# Patient Record
Sex: MALE | Race: WHITE | Employment: FULL TIME | URBAN - METROPOLITAN AREA
[De-identification: names, ages, dates, MRNs, and addresses within clinical notes are randomized per-mention and may not be internally consistent; named-entity substitution may affect disease eponyms.]

---

## 2017-03-26 ENCOUNTER — HOSPITAL ENCOUNTER (EMERGENCY)
Facility: HOSPITAL | Age: 42
Discharge: HOME | End: 2017-03-26
Attending: EMERGENCY MEDICINE | Admitting: INTERNAL MEDICINE
Payer: COMMERCIAL

## 2017-03-26 ENCOUNTER — ANESTHESIA (OUTPATIENT)
Dept: ENDOSCOPY | Facility: HOSPITAL | Age: 42
End: 2017-03-26
Payer: COMMERCIAL

## 2017-03-26 ENCOUNTER — TELEPHONE (OUTPATIENT)
Dept: GASTROENTEROLOGY | Facility: CLINIC | Age: 42
End: 2017-03-26

## 2017-03-26 ENCOUNTER — ANESTHESIA EVENT (OUTPATIENT)
Dept: ENDOSCOPY | Facility: HOSPITAL | Age: 42
End: 2017-03-26
Payer: COMMERCIAL

## 2017-03-26 ENCOUNTER — SURGERY (OUTPATIENT)
Age: 42
End: 2017-03-26

## 2017-03-26 VITALS
HEART RATE: 75 BPM | HEIGHT: 74 IN | TEMPERATURE: 98 F | RESPIRATION RATE: 16 BRPM | DIASTOLIC BLOOD PRESSURE: 72 MMHG | OXYGEN SATURATION: 92 % | SYSTOLIC BLOOD PRESSURE: 123 MMHG | WEIGHT: 225 LBS | BODY MASS INDEX: 28.88 KG/M2

## 2017-03-26 DIAGNOSIS — T18.108A ESOPHAGEAL FOREIGN BODY, INITIAL ENCOUNTER: Primary | ICD-10-CM

## 2017-03-26 LAB
ANION GAP SERPL CALC-SCNC: 9 MMOL/L (ref 0–18)
BASOPHILS # BLD: 0 K/UL (ref 0–0.2)
BASOPHILS NFR BLD: 0 %
BUN SERPL-MCNC: 20 MG/DL (ref 8–20)
BUN/CREAT SERPL: 18 (ref 10–20)
CALCIUM SERPL-MCNC: 9.1 MG/DL (ref 8.5–10.5)
CHLORIDE SERPL-SCNC: 106 MMOL/L (ref 95–110)
CO2 SERPL-SCNC: 25 MMOL/L (ref 22–32)
CREAT SERPL-MCNC: 1.11 MG/DL (ref 0.5–1.5)
EOSINOPHIL # BLD: 0.1 K/UL (ref 0–0.7)
EOSINOPHIL NFR BLD: 1 %
ERYTHROCYTE [DISTWIDTH] IN BLOOD BY AUTOMATED COUNT: 13.3 % (ref 11–15)
GLUCOSE SERPL-MCNC: 110 MG/DL (ref 70–99)
HCT VFR BLD AUTO: 44.2 % (ref 41–52)
HGB BLD-MCNC: 14.9 G/DL (ref 13.5–17.5)
LYMPHOCYTES # BLD: 1.3 K/UL (ref 1–4)
LYMPHOCYTES NFR BLD: 16 %
MCH RBC QN AUTO: 29.9 PG (ref 27–32)
MCHC RBC AUTO-ENTMCNC: 33.8 G/DL (ref 32–37)
MCV RBC AUTO: 88.5 FL (ref 80–100)
MONOCYTES # BLD: 0.5 K/UL (ref 0–1)
MONOCYTES NFR BLD: 6 %
NEUTROPHILS # BLD AUTO: 6.2 K/UL (ref 1.8–7.7)
NEUTROPHILS NFR BLD: 77 %
OSMOLALITY UR CALC.SUM OF ELEC: 293 MOSM/KG (ref 275–295)
PLATELET # BLD AUTO: 209 K/UL (ref 140–400)
PMV BLD AUTO: 8.4 FL (ref 7.4–10.3)
POTASSIUM SERPL-SCNC: 4.1 MMOL/L (ref 3.3–5.1)
RBC # BLD AUTO: 4.99 M/UL (ref 4.5–5.9)
SODIUM SERPL-SCNC: 140 MMOL/L (ref 136–144)
WBC # BLD AUTO: 8.1 K/UL (ref 4–11)

## 2017-03-26 PROCEDURE — 0DB68ZX EXCISION OF STOMACH, VIA NATURAL OR ARTIFICIAL OPENING ENDOSCOPIC, DIAGNOSTIC: ICD-10-PCS | Performed by: INTERNAL MEDICINE

## 2017-03-26 PROCEDURE — 43247 EGD REMOVE FOREIGN BODY: CPT | Performed by: INTERNAL MEDICINE

## 2017-03-26 PROCEDURE — 43239 EGD BIOPSY SINGLE/MULTIPLE: CPT | Performed by: INTERNAL MEDICINE

## 2017-03-26 PROCEDURE — 0DB38ZX EXCISION OF LOWER ESOPHAGUS, VIA NATURAL OR ARTIFICIAL OPENING ENDOSCOPIC, DIAGNOSTIC: ICD-10-PCS | Performed by: INTERNAL MEDICINE

## 2017-03-26 PROCEDURE — 0DB18ZX EXCISION OF UPPER ESOPHAGUS, VIA NATURAL OR ARTIFICIAL OPENING ENDOSCOPIC, DIAGNOSTIC: ICD-10-PCS | Performed by: INTERNAL MEDICINE

## 2017-03-26 PROCEDURE — 0DC58ZZ EXTIRPATION OF MATTER FROM ESOPHAGUS, VIA NATURAL OR ARTIFICIAL OPENING ENDOSCOPIC: ICD-10-PCS | Performed by: INTERNAL MEDICINE

## 2017-03-26 RX ORDER — LIDOCAINE HYDROCHLORIDE 10 MG/ML
INJECTION, SOLUTION EPIDURAL; INFILTRATION; INTRACAUDAL; PERINEURAL AS NEEDED
Status: DISCONTINUED | OUTPATIENT
Start: 2017-03-26 | End: 2017-03-26 | Stop reason: SURG

## 2017-03-26 RX ORDER — ONDANSETRON 2 MG/ML
INJECTION INTRAMUSCULAR; INTRAVENOUS AS NEEDED
Status: DISCONTINUED | OUTPATIENT
Start: 2017-03-26 | End: 2017-03-26 | Stop reason: SURG

## 2017-03-26 RX ORDER — MIDAZOLAM HYDROCHLORIDE 1 MG/ML
INJECTION INTRAMUSCULAR; INTRAVENOUS AS NEEDED
Status: DISCONTINUED | OUTPATIENT
Start: 2017-03-26 | End: 2017-03-26 | Stop reason: SURG

## 2017-03-26 RX ORDER — DEXAMETHASONE SODIUM PHOSPHATE 4 MG/ML
VIAL (ML) INJECTION AS NEEDED
Status: DISCONTINUED | OUTPATIENT
Start: 2017-03-26 | End: 2017-03-26 | Stop reason: SURG

## 2017-03-26 RX ORDER — SUCCINYLCHOLINE CHLORIDE 20 MG/ML
INJECTION INTRAMUSCULAR; INTRAVENOUS AS NEEDED
Status: DISCONTINUED | OUTPATIENT
Start: 2017-03-26 | End: 2017-03-26 | Stop reason: SURG

## 2017-03-26 RX ORDER — OMEPRAZOLE 40 MG/1
40 CAPSULE, DELAYED RELEASE ORAL DAILY
Qty: 30 CAPSULE | Refills: 3 | Status: SHIPPED | OUTPATIENT
Start: 2017-03-26 | End: 2017-04-25

## 2017-03-26 RX ADMIN — LIDOCAINE HYDROCHLORIDE 50 MG: 10 INJECTION, SOLUTION EPIDURAL; INFILTRATION; INTRACAUDAL; PERINEURAL at 14:24:00

## 2017-03-26 RX ADMIN — MIDAZOLAM HYDROCHLORIDE 2 MG: 1 INJECTION INTRAMUSCULAR; INTRAVENOUS at 14:24:00

## 2017-03-26 RX ADMIN — ONDANSETRON 4 MG: 2 INJECTION INTRAMUSCULAR; INTRAVENOUS at 14:33:00

## 2017-03-26 RX ADMIN — DEXAMETHASONE SODIUM PHOSPHATE 4 MG: 4 MG/ML VIAL (ML) INJECTION at 14:33:00

## 2017-03-26 RX ADMIN — SUCCINYLCHOLINE CHLORIDE 140 MG: 20 INJECTION INTRAMUSCULAR; INTRAVENOUS at 14:25:00

## 2017-03-26 NOTE — ANESTHESIA PREPROCEDURE EVALUATION
Anesthesia PreOp Note    HPI:     Daniella Moya is a 39year old male who presents for preoperative consultation requested by: Oleg Womack MD    Date of Surgery: 3/26/2017    Procedure(s):  ESOPHAGOGASTRODUODENOSCOPY (EGD)  Indication: food impaction Narrative       Available pre-op labs reviewed.     Lab Results  Component Value Date   WBC 8.1 03/26/2017   RBC 4.99 03/26/2017   HGB 14.9 03/26/2017   HCT 44.2 03/26/2017   MCV 88.5 03/26/2017   MCH 29.9 03/26/2017   MCHC 33.8 03/26/2017   RDW 13.3 03/26/

## 2017-03-26 NOTE — ED PROVIDER NOTES
Patient Seen in: [facility] AND [facility] Emergency Department    History   Patient presents with:  FB in Throat (GI, respiratory)    Stated Complaint: Hard to swallow    HPI    77-year-old male with history of GERD presents with complaints of difficulty swallo (36.7 °C)   Temp src 03/26/17 1236 Temporal   SpO2 03/26/17 1236 97 %   O2 Device 03/26/17 1236 None (Room air)       Current:/75 mmHg  Pulse 68  Temp(Src) 98.1 °F (36.7 °C) (Temporal)  Resp 15  Ht 188 cm (6' 2\")  Wt 102.059 kg  BMI 28.88 kg/m2  SpO team for EGD. Disposition and Plan     Clinical Impression:  Esophageal foreign body, initial encounter  (primary encounter diagnosis)    Disposition:  Send to or/cath/gi    Follow-up:  No follow-up provider specified.     Medications Prescribed:  Curr

## 2017-03-26 NOTE — TELEPHONE ENCOUNTER
GI staff: please call patient to schedule EGD with MAC for dilatation of esophageal stricture, in 4 weeks.

## 2017-03-26 NOTE — ED NOTES
Pt stable, comfortable; aware of plan and has spoken to family. Dr Rashad Dang confirmed pt is ready for the GI lab. Pt has had nothing p.o. And is still spitting secretions in cup.  Pt to be discharged from endo after procedure is complete

## 2017-03-26 NOTE — OPERATIVE REPORT
ESOPHAGOGASTRODUODENOSCOPY (EGD) REPORT    Vinod De Leon DOB 10/16/1975 Age 39year old   PCP Pieter Hung MD Endoscopist Chandan Savage MD     Date of procedure: 2017    Procedure: EGD w/food impaction removal and biopsy.     Pre-opera esophagitis and pressure erosions and ulcerations. There was moderate esophagitis with friability. There was a smooth narrowing of the distal esophagus due to inflammation, no discrete stricture was present.  Biopsies taken of the distal and proximal esopha

## 2017-03-26 NOTE — CONSULTS
[address]   Gastroenterology Consultation Note    Brunilda Burk  Patient Status:    Emergency  Date of Admission:         3/26/2017, Hospital day #0  Attending:   Hansel Herzog MD  PCP:     Claude Macias MD    Reason for [facility] HPI  GENITOURINARY:  negative for dysuria or gross hematuria  INTEGUMENT/BREAST:  SKIN:  negative for jaundice   ALLERGIC/IMMUNOLOGIC:  negative for hay fever  ENDOCRINE:  negative for cold intolerance and heat intolerance  MUSCULOSKELETAL:  negative for j perforation all leading to prolonged hospitalization, surgical intervention, or even death. I also specifically mentioned the miss rate of upper endoscopy of 5-10% in the best of all circumstances. All questions were answered to the patient’s satisfaction.

## 2017-03-26 NOTE — ED INITIAL ASSESSMENT (HPI)
Pt reports after eating dinner reports difficulty swallowing. Pt reports eating ribs, and cauliflower. Pt reports hx of GERD. Randall Angles No drooling noted. No resp distress noted. Pt reports unable to eat/drink b/c \"There is something stuck in my throat.  I have be

## 2017-03-27 ENCOUNTER — TELEPHONE (OUTPATIENT)
Dept: GASTROENTEROLOGY | Facility: CLINIC | Age: 42
End: 2017-03-27

## 2017-03-27 NOTE — TELEPHONE ENCOUNTER
Path shows eosinophilic esophagitis, along with possibly GERD. Discussed pathophysiology, disease course of EoE. He needs to be on BID steroid swallowed treatment for 6-8 weeks, sent to pharmacy.     GI staff: can you please schedule EGD with MAC with dil f

## 2017-03-27 NOTE — TELEPHONE ENCOUNTER
Dr. Jevon Saleem I received another TE previously on 3/26/17 from you stating to schedule EGD for esophageal stricture do you want me to add an additional dx of EoE or remove esophageal stricture? Please advise. Thank you    MICHAEL. . I did call the patient and left

## 2017-03-28 NOTE — TELEPHONE ENCOUNTER
Scheduled for:  EGD w/Dil [address]  Provider Name:  Dr. Ragini Martin  Date:  5/17/17  Location:  [facility]  Sedation:  MAC  Time:  1000 (pt is aware to arrive at 0900)  Prep:  NPO after midnight, mailed 3/29/17 with codes  Meds/Allergies Reconciled?:  Yes  Diagnosis with

## 2017-03-28 NOTE — TELEPHONE ENCOUNTER
CBLM to schedule procedure.  Please transfer to [facility] beds at the [facility] in GI

## 2017-05-17 ENCOUNTER — ANESTHESIA EVENT (OUTPATIENT)
Dept: ENDOSCOPY | Facility: HOSPITAL | Age: 42
End: 2017-05-17
Payer: COMMERCIAL

## 2017-05-17 ENCOUNTER — HOSPITAL ENCOUNTER (OUTPATIENT)
Facility: HOSPITAL | Age: 42
Setting detail: HOSPITAL OUTPATIENT SURGERY
Discharge: HOME | End: 2017-05-17
Attending: INTERNAL MEDICINE | Admitting: INTERNAL MEDICINE
Payer: COMMERCIAL

## 2017-05-17 ENCOUNTER — ANESTHESIA (OUTPATIENT)
Dept: ENDOSCOPY | Facility: HOSPITAL | Age: 42
End: 2017-05-17
Payer: COMMERCIAL

## 2017-05-17 ENCOUNTER — SURGERY (OUTPATIENT)
Age: 42
End: 2017-05-17

## 2017-05-17 DIAGNOSIS — K20.90 ESOPHAGITIS: Primary | ICD-10-CM

## 2017-05-17 DIAGNOSIS — K22.2 SCHATZKI'S RING: ICD-10-CM

## 2017-05-17 PROCEDURE — 43239 EGD BIOPSY SINGLE/MULTIPLE: CPT | Performed by: INTERNAL MEDICINE

## 2017-05-17 PROCEDURE — 0D748ZZ DILATION OF ESOPHAGOGASTRIC JUNCTION, VIA NATURAL OR ARTIFICIAL OPENING ENDOSCOPIC: ICD-10-PCS | Performed by: INTERNAL MEDICINE

## 2017-05-17 PROCEDURE — 0DB38ZX EXCISION OF LOWER ESOPHAGUS, VIA NATURAL OR ARTIFICIAL OPENING ENDOSCOPIC, DIAGNOSTIC: ICD-10-PCS | Performed by: INTERNAL MEDICINE

## 2017-05-17 PROCEDURE — 43248 EGD GUIDE WIRE INSERTION: CPT | Performed by: INTERNAL MEDICINE

## 2017-05-17 RX ORDER — SODIUM CHLORIDE, SODIUM LACTATE, POTASSIUM CHLORIDE, CALCIUM CHLORIDE 600; 310; 30; 20 MG/100ML; MG/100ML; MG/100ML; MG/100ML
INJECTION, SOLUTION INTRAVENOUS CONTINUOUS
Status: DISCONTINUED | OUTPATIENT
Start: 2017-05-17 | End: 2017-05-17

## 2017-05-17 RX ORDER — SODIUM CHLORIDE, SODIUM LACTATE, POTASSIUM CHLORIDE, CALCIUM CHLORIDE 600; 310; 30; 20 MG/100ML; MG/100ML; MG/100ML; MG/100ML
INJECTION, SOLUTION INTRAVENOUS CONTINUOUS PRN
Status: DISCONTINUED | OUTPATIENT
Start: 2017-05-17 | End: 2017-05-17 | Stop reason: SURG

## 2017-05-17 RX ORDER — GLYCOPYRROLATE 0.2 MG/ML
INJECTION INTRAMUSCULAR; INTRAVENOUS AS NEEDED
Status: DISCONTINUED | OUTPATIENT
Start: 2017-05-17 | End: 2017-05-17 | Stop reason: SURG

## 2017-05-17 RX ORDER — LIDOCAINE HYDROCHLORIDE 10 MG/ML
INJECTION, SOLUTION EPIDURAL; INFILTRATION; INTRACAUDAL; PERINEURAL AS NEEDED
Status: DISCONTINUED | OUTPATIENT
Start: 2017-05-17 | End: 2017-05-17 | Stop reason: SURG

## 2017-05-17 RX ADMIN — GLYCOPYRROLATE 0.2 MG: 0.2 INJECTION INTRAMUSCULAR; INTRAVENOUS at 09:57:00

## 2017-05-17 RX ADMIN — SODIUM CHLORIDE, SODIUM LACTATE, POTASSIUM CHLORIDE, CALCIUM CHLORIDE: 600; 310; 30; 20 INJECTION, SOLUTION INTRAVENOUS at 09:55:00

## 2017-05-17 RX ADMIN — LIDOCAINE HYDROCHLORIDE 50 MG: 10 INJECTION, SOLUTION EPIDURAL; INFILTRATION; INTRACAUDAL; PERINEURAL at 09:58:00

## 2017-05-17 NOTE — H&P
History & Physical Examination    Patient Name: Na Collins  MRN: [id number]  [facility]: [id number]  YOB: 1975    Diagnosis: dysphagia, eosinophilic esophagitis, esophageal stricture      Prescriptions prior to admission:  Fluticasone Propionate Gastroenterology  5/17/2017  9:31 AM

## 2017-05-17 NOTE — ANESTHESIA PREPROCEDURE EVALUATION
Anesthesia PreOp Note    HPI:     Cordell Boyd is a 39year old male who presents for preoperative consultation requested by: Charity Ruvalcaba MD    Date of Surgery: 5/17/2017    Procedure(s):  ESOPHAGOGASTRODUODENOSCOPY (EGD)  Indication: Esophagitis, eo Packs/Day     Types: Cigarettes    Quit date: 01/01/2003    Smokeless tobacco: Not on file    Alcohol Use: Yes    Comment: beer and wine - 1 drink, daily    Drug Use: No    Sexual Activity: Not on file   Not on file  Other Topics Concern    Caffeine Concer

## 2017-05-17 NOTE — ANESTHESIA POSTPROCEDURE EVALUATION
Patient: Prasad Fernández    Procedure Summary     Date Anesthesia Start Anesthesia Stop Room / Location    05/17/17 0956  [facility] ENDOSCOPY 01 / [facility] ENDOSCOPY       Procedure Diagnosis Surgeon Responsible Provider    ESOPHAGOGASTRODUODENOSCOPY (EGD) (N/A ) Jenny

## 2017-05-18 VITALS
RESPIRATION RATE: 14 BRPM | HEIGHT: 74 IN | OXYGEN SATURATION: 97 % | HEART RATE: 60 BPM | DIASTOLIC BLOOD PRESSURE: 76 MMHG | WEIGHT: 225 LBS | SYSTOLIC BLOOD PRESSURE: 128 MMHG | BODY MASS INDEX: 28.88 KG/M2

## 2017-05-23 ENCOUNTER — TELEPHONE (OUTPATIENT)
Dept: GASTROENTEROLOGY | Facility: CLINIC | Age: 42
End: 2017-05-23

## 2020-12-11 ENCOUNTER — HOSPITAL ENCOUNTER (OUTPATIENT)
Age: 45
Discharge: HOME | End: 2020-12-11
Payer: COMMERCIAL

## 2020-12-11 VITALS
BODY MASS INDEX: 29 KG/M2 | WEIGHT: 225 LBS | SYSTOLIC BLOOD PRESSURE: 136 MMHG | TEMPERATURE: 98 F | HEART RATE: 74 BPM | DIASTOLIC BLOOD PRESSURE: 90 MMHG | OXYGEN SATURATION: 100 % | RESPIRATION RATE: 17 BRPM

## 2020-12-11 DIAGNOSIS — M54.12 CERVICAL RADICULOPATHY: Primary | ICD-10-CM

## 2020-12-11 DIAGNOSIS — R29.898 ARM WEAKNESS: ICD-10-CM

## 2020-12-11 PROCEDURE — 99203 OFFICE O/P NEW LOW 30 MIN: CPT | Performed by: EMERGENCY MEDICINE

## 2020-12-11 RX ORDER — OMEPRAZOLE 10 MG/1
10 CAPSULE, DELAYED RELEASE ORAL DAILY
COMMUNITY

## 2020-12-11 RX ORDER — PREDNISONE 20 MG/1
TABLET ORAL
Qty: 8 TABLET | Refills: 0 | Status: SHIPPED | OUTPATIENT
Start: 2020-12-11 | End: 2021-02-01 | Stop reason: ALTCHOICE

## 2020-12-11 RX ORDER — METHOCARBAMOL 500 MG/1
TABLET, FILM COATED ORAL
Qty: 14 TABLET | Refills: 0 | Status: SHIPPED | OUTPATIENT
Start: 2020-12-11 | End: 2021-10-18

## 2020-12-11 NOTE — ED PROVIDER NOTES
Patient Seen in: [facility]      History   Patient presents with:  Neck Pain: I think I have a pinched nerve. Pain in shoulder and neck.  Weakness in shoulder started this morning. - Entered by patient  Shoulder Pain    Stated Complaint: Arm patient/caregiver and is not pertinent to presenting problem. No pertinent social history. Review of Systems    Positive for stated complaint: Arm or Hand Injury - I think I have a pinched nerve. Pain in shoulder and neck. *  Other systems are compromise at this time. Will start patient on prednisone 5 days, and Robaxin muscle relaxer. Rechecked patient. Updated patient  on all findings and plan, who verbalized understanding and agreement with the plan. All questions answered.      I have gi

## 2020-12-11 NOTE — ED INITIAL ASSESSMENT (HPI)
Pt states that he started with neck pain a week ago. He developed pain in his left shoulder 2-3 days ago with weakness in left arm, has full range of motion. Pt unsure if numbness or tingling. Denies injury.

## 2020-12-15 ENCOUNTER — HOSPITAL ENCOUNTER (OUTPATIENT)
Dept: GENERAL RADIOLOGY | Facility: HOSPITAL | Age: 45
Discharge: HOME | End: 2020-12-15
Attending: ORTHOPAEDIC SURGERY
Payer: COMMERCIAL

## 2020-12-15 ENCOUNTER — OFFICE VISIT (OUTPATIENT)
Dept: ORTHOPEDICS CLINIC | Facility: CLINIC | Age: 45
End: 2020-12-15
Payer: COMMERCIAL

## 2020-12-15 DIAGNOSIS — M25.512 LEFT SHOULDER PAIN, UNSPECIFIED CHRONICITY: Primary | ICD-10-CM

## 2020-12-15 DIAGNOSIS — M25.512 LEFT SHOULDER PAIN, UNSPECIFIED CHRONICITY: ICD-10-CM

## 2020-12-15 DIAGNOSIS — M54.12 CERVICAL RADICULOPATHY: ICD-10-CM

## 2020-12-15 PROCEDURE — 99204 OFFICE O/P NEW MOD 45 MIN: CPT | Performed by: ORTHOPAEDIC SURGERY

## 2020-12-15 PROCEDURE — 73030 X-RAY EXAM OF SHOULDER: CPT | Performed by: ORTHOPAEDIC SURGERY

## 2020-12-15 RX ORDER — METHYLPREDNISOLONE 4 MG/1
TABLET ORAL
Qty: 1 PACKAGE | Refills: 0 | Status: SHIPPED | OUTPATIENT
Start: 2020-12-15 | End: 2021-02-01 | Stop reason: ALTCHOICE

## 2020-12-16 NOTE — H&P
NURSING INTAKE COMMENTS: Patient presents with:  Shoulder Pain: Left - onset few weeks ago - no injury - stated with neck pain and went down into the shoulder and he has reduced strength and ROM - occasinal numbness and tingling in the hand and fingers - p pill for 2 days. 8 tablet 0   • methocarbamol 500 MG Oral Tab 1-2 tabs every 6 hours as needed. 14 tablet 0   • LORazepam (ATIVAN) 0.5 MG Oral Tab Take  by mouth. • Vitamin D3 (VITAMIN D3) 2000 UNITS Oral Cap Take  by mouth.        No Known Allergies  F the shoulder without shoulder discomfort. He had negative crossover no pain to the [facility] joint. He had 5/5 strength with bicep strength no tenderness on the biceps tendon along the bicipital groove.  He has 5/5 strength with rotator cuff strength testing no te Future  -     Cancel: XR CERVICAL SPINE (2 VIEWS) (CPT=72040); Future  -     MRI SPINE CERVICAL (CPT=72141);  Future    Other orders  -     methylPREDNISolone 4 MG Oral Tablet Therapy Pack; TAKE AS PER INSTRUCTION SHEET        Assessment: Left cervical radi

## 2020-12-22 ENCOUNTER — HOSPITAL ENCOUNTER (OUTPATIENT)
Dept: MRI IMAGING | Facility: HOSPITAL | Age: 45
Discharge: HOME | End: 2020-12-22
Attending: PHYSICIAN ASSISTANT
Payer: COMMERCIAL

## 2020-12-22 DIAGNOSIS — M54.12 CERVICAL RADICULOPATHY: ICD-10-CM

## 2020-12-22 PROCEDURE — 72141 MRI NECK SPINE W/O DYE: CPT | Performed by: PHYSICIAN ASSISTANT

## 2021-01-04 ENCOUNTER — OFFICE VISIT (OUTPATIENT)
Dept: ORTHOPEDICS CLINIC | Facility: CLINIC | Age: 46
End: 2021-01-04
Payer: COMMERCIAL

## 2021-01-04 VITALS — WEIGHT: 230 LBS | BODY MASS INDEX: 29.52 KG/M2 | HEIGHT: 74 IN

## 2021-01-04 DIAGNOSIS — M54.12 CERVICAL RADICULOPATHY: Primary | ICD-10-CM

## 2021-01-04 DIAGNOSIS — M48.02 FORAMINAL STENOSIS OF CERVICAL REGION: ICD-10-CM

## 2021-01-04 PROCEDURE — 99213 OFFICE O/P EST LOW 20 MIN: CPT | Performed by: ORTHOPAEDIC SURGERY

## 2021-01-04 PROCEDURE — 3008F BODY MASS INDEX DOCD: CPT | Performed by: ORTHOPAEDIC SURGERY

## 2021-01-04 NOTE — PROGRESS NOTES
NURSING INTAKE COMMENTS: Patient presents with:  Shoulder Pain: left shoulder, MRI results      HPI: This 39year old male presents today to review his left cervical radiculopathy as well as the recent cervical MRI.   Since her last visit, his symptoms have file    Tobacco Use      Smoking status: Former Smoker        Packs/day: 0.25        Types: Cigarettes        Quit date: 2003        Years since quittin.0      Smokeless tobacco: Never Used    Substance and Sexual Activity      Alcohol use:  Yes was a negative Anne-Marie test and symmetrically diminished reflexes. Balance and gait were normal.    Imaging: MRI cervical spine was reviewed with him both film and report. He has severe stenosis at the left C4-5 and left C5-6.   I think the C4-5 level is swelling of the prevertebral soft tissues. Small dependent left maxillary sinus retention cyst.  CERVICAL DISC LEVELS: C2-C3: No significant disc/facet abnormality, spinal stenosis, or foraminal stenosis.   C3-C4: Posterior disc-osteophyte complex with rig 03/26/2017     03/26/2017      Lab Results   Component Value Date     (H) 03/26/2017    BUN 20 03/26/2017    CREATSERUM 1.11 03/26/2017    GFRNAA >60 03/26/2017    GFRAA >60 03/26/2017        Assessment and Plan:  Diagnoses and all orders for

## 2021-02-01 ENCOUNTER — OFFICE VISIT (OUTPATIENT)
Dept: ORTHOPEDICS CLINIC | Facility: CLINIC | Age: 46
End: 2021-02-01
Payer: COMMERCIAL

## 2021-02-01 VITALS — BODY MASS INDEX: 29.52 KG/M2 | HEIGHT: 74 IN | WEIGHT: 230 LBS

## 2021-02-01 DIAGNOSIS — M48.02 FORAMINAL STENOSIS OF CERVICAL REGION: ICD-10-CM

## 2021-02-01 DIAGNOSIS — M54.12 CERVICAL RADICULOPATHY: Primary | ICD-10-CM

## 2021-02-01 PROCEDURE — 99213 OFFICE O/P EST LOW 20 MIN: CPT | Performed by: ORTHOPAEDIC SURGERY

## 2021-02-01 PROCEDURE — 3008F BODY MASS INDEX DOCD: CPT | Performed by: ORTHOPAEDIC SURGERY

## 2021-02-01 NOTE — PROGRESS NOTES
NURSING INTAKE COMMENTS: Patient presents with:  Shoulder Pain: f/u regarding left shoulder. Denies any pain at this time. per patient doing better.        HPI: This 39year old male presents today for follow-up with left cervical radiculopathy from left C4 and Sexual Activity      Alcohol use: Yes        Comment: beer and wine - 1 drink, daily      Drug use: No      Sexual activity: Not on file       Review of Systems:  GENERAL: feels generally well, no significant weight loss or weight gain  SKIN: no ulcera >60 03/26/2017        Assessment and Plan:  Diagnoses and all orders for this visit:    Cervical radiculopathy    Foraminal stenosis of cervical region        Assessment: Left C4-C5 and left C5-C6 stenosis from disc osteophyte complex with symptoms improvi

## 2021-10-18 ENCOUNTER — OFFICE VISIT (OUTPATIENT)
Dept: INTEGRATIVE MEDICINE | Facility: CLINIC | Age: 46
End: 2021-10-18
Payer: COMMERCIAL

## 2021-10-18 VITALS
HEART RATE: 58 BPM | BODY MASS INDEX: 29.95 KG/M2 | WEIGHT: 233.38 LBS | OXYGEN SATURATION: 97 % | SYSTOLIC BLOOD PRESSURE: 128 MMHG | DIASTOLIC BLOOD PRESSURE: 74 MMHG | HEIGHT: 74 IN

## 2021-10-18 DIAGNOSIS — E66.3 OVERWEIGHT (BMI 25.0-29.9): ICD-10-CM

## 2021-10-18 DIAGNOSIS — K21.9 GASTROESOPHAGEAL REFLUX DISEASE, UNSPECIFIED WHETHER ESOPHAGITIS PRESENT: ICD-10-CM

## 2021-10-18 DIAGNOSIS — Z12.11 ENCOUNTER FOR SCREENING COLONOSCOPY: ICD-10-CM

## 2021-10-18 DIAGNOSIS — Z00.00 WELL ADULT EXAM: Primary | ICD-10-CM

## 2021-10-18 PROCEDURE — 3074F SYST BP LT 130 MM HG: CPT | Performed by: FAMILY MEDICINE

## 2021-10-18 PROCEDURE — 99386 PREV VISIT NEW AGE 40-64: CPT | Performed by: FAMILY MEDICINE

## 2021-10-18 PROCEDURE — 3078F DIAST BP <80 MM HG: CPT | Performed by: FAMILY MEDICINE

## 2021-10-18 PROCEDURE — 3008F BODY MASS INDEX DOCD: CPT | Performed by: FAMILY MEDICINE

## 2021-10-18 NOTE — PATIENT INSTRUCTIONS
Optional Testing:     [facility] FIT 22 - Food Sensitivity Testing  This test evaluates 22 of the most common food sensitivities tested on the  test. The test measures IgG and Immune Complexes the same way the  test does. The test is run using a blo you need direct help. The products and items listed below (the “Products”)  and their claims have not been evaluated by the Food and Drug Administration. Dietary products are not intended to treat, prevent, mitigate or cure disease.  Ultimately, you mus essential, too. Below are guidelines for these, for men ages 36 to 52. Talk with your healthcare provider to make sure you’re up to date on what you need.   Screening Who needs it How often   Alcohol misuse All men in this age group At routine exams   Blood your healthcare provider 2 doses given at least 6 months apart   Hepatitis B Men at increased risk for infection – talk with your healthcare provider 3 doses over 6 months; second dose should be given 1 month after the first dose; the third dose should be reserved. This information is not intended as a substitute for professional medical care. Always follow your healthcare professional's instructions.

## 2021-10-18 NOTE — PROGRESS NOTES
Enrrique Cooper is a 55year old male.   Patient presents with:  Physical: NP physical       HPI:   Here for physical and wellness visit     Exercise: Walking dog, Hockey  Diet:Mostly cooks, eats 1-2 meals out per week  Stress:Mild to moderate   Occupation: I Oral Cap Take  by mouth.          SOCIAL HISTORY:   Social History    Socioeconomic History      Marital status:       Spouse name: Not on file      Number of children: Not on file      Years of education: Not on file      Highest education level: No Attends [employer]: Not on file      Active Member of Clubs or Organizations: Not on file      Attends Club or Organization Meetings: Not on file      Marital Status: Not on file  Intimate Partner Violence:       Fear of Current or Ex-Partner: Not Cervical back: Normal range of motion and neck supple. Skin:     General: Skin is warm. Findings: No rash. Neurological:      General: No focal deficit present. Mental Status: He is alert and oriented to person, place, and time.    Psychi PANEL  - HEMOGLOBIN A1C    2.  Gastroesophageal reflux disease, unspecified whether esophagitis present  - GASTRO - INTERNAL  - CBC WITH DIFFERENTIAL WITH PLATELET  - COMP METABOLIC PANEL (14)  - VITAMIN D, 25-HYDROXY  - FREE T4 (FREE THYROXINE)  - ASSAY, T month to get back. See https://[employer]. [employer]/ for more information. PLEASE NOTE: As this is a lab test done by an outside company, these results do not pull into your [employer] lab results online.  The results will be given to you online and a  its affiliates and its [facility] are not liable for the patients use of the Products.  [address] [place] [address] makes no representations or warranties of any kind, expressed or implied, as to the Products, including, but not limited to its efficacy, be and have 1 or more other risk factors for diabetes At least every 3 years (yearly if blood sugar has begun to rise)   Type 2 diabetes All men with prediabetes Every year   Hepatitis C Men at increased risk for infection – talk with your healthcare provider increased risk for infection – talk with your healthcare provider 1 or more doses   Pneumococcal conjugate vaccine (PCV13) and pneumococcal polysaccharide vaccine (PPSV23) Men at increased risk for infection – talk with your healthcare provider PCV13: 1 do personalized nutritional guide, and improving a patient’s state of health and energy levels. This test is not covered by insurance and costs $100. Test results take about month to get back. See https://Meetapp. [employer]/ for more information. dietary, or exercise program, especially if you are pregnant or have any pre-existing injuries or medical conditions.  The patient agrees that the [facility] and its affiliates and its [facility] are not liable for t Depression All men in this age group At routine exams   Type 2 diabetes or prediabetes All men beginning at age 39 and men  without symptoms at any age who are overweight or obese and have 1 or more other risk factors for diabetes At least every 3 years in this age group Once a year   Measles, mumps, rubella (MMR) All men in this age group who have no record of these infections or vaccines 1 or 2 doses   Meningococcal Men at increased risk for infection – talk with your healthcare provider 1 or more doses

## 2021-10-18 NOTE — PATIENT INSTRUCTIONS
Optional Testing:     [facility] FIT 22 - Food Sensitivity Testing  This test evaluates 22 of the most common food sensitivities tested on the  test. The test measures IgG and Immune Complexes the same way the  test does. The test is run using a blo you need direct help. The products and items listed below (the “Products”)  and their claims have not been evaluated by the Food and Drug Administration. Dietary products are not intended to treat, prevent, mitigate or cure disease.  Ultimately, you mus essential, too. Below are guidelines for these, for men ages 36 to 52. Talk with your healthcare provider to make sure you’re up to date on what you need.   Screening Who needs it How often   Alcohol misuse All men in this age group At routine exams   Blood your healthcare provider 2 doses given at least 6 months apart   Hepatitis B Men at increased risk for infection – talk with your healthcare provider 3 doses over 6 months; second dose should be given 1 month after the first dose; the third dose should be reserved. This information is not intended as a substitute for professional medical care. Always follow your healthcare professional's instructions.

## 2022-10-20 ENCOUNTER — OFFICE VISIT (OUTPATIENT)
Dept: INTEGRATIVE MEDICINE | Facility: CLINIC | Age: 47
End: 2022-10-20
Payer: COMMERCIAL

## 2022-10-20 VITALS
OXYGEN SATURATION: 98 % | SYSTOLIC BLOOD PRESSURE: 119 MMHG | HEART RATE: 59 BPM | BODY MASS INDEX: 31 KG/M2 | WEIGHT: 243 LBS | DIASTOLIC BLOOD PRESSURE: 80 MMHG

## 2022-10-20 DIAGNOSIS — E66.3 OVERWEIGHT (BMI 25.0-29.9): ICD-10-CM

## 2022-10-20 DIAGNOSIS — Z00.00 WELL ADULT EXAM: Primary | ICD-10-CM

## 2022-10-20 DIAGNOSIS — E78.5 HYPERLIPIDEMIA, UNSPECIFIED HYPERLIPIDEMIA TYPE: ICD-10-CM

## 2022-10-20 DIAGNOSIS — Z12.11 ENCOUNTER FOR SCREENING COLONOSCOPY: ICD-10-CM

## 2022-10-20 PROCEDURE — 3074F SYST BP LT 130 MM HG: CPT | Performed by: FAMILY MEDICINE

## 2022-10-20 PROCEDURE — 3079F DIAST BP 80-89 MM HG: CPT | Performed by: FAMILY MEDICINE

## 2022-10-20 PROCEDURE — 99396 PREV VISIT EST AGE 40-64: CPT | Performed by: FAMILY MEDICINE

## 2022-10-28 LAB
ABSOLUTE BASOPHILS: 40 CELLS/UL (ref 0–200)
ABSOLUTE EOSINOPHILS: 189 CELLS/UL (ref 15–500)
ABSOLUTE LYMPHOCYTES: 1804 CELLS/UL (ref 850–3900)
ABSOLUTE MONOCYTES: 334 CELLS/UL (ref 200–950)
ABSOLUTE NEUTROPHILS: 2033 CELLS/UL (ref 1500–7800)
ALBUMIN/GLOBULIN RATIO: 1.8 (CALC) (ref 1–2.5)
ALBUMIN: 4.4 G/DL (ref 3.6–5.1)
ALKALINE PHOSPHATASE: 49 U/L (ref 36–130)
ALT: 16 U/L (ref 9–46)
AST: 18 U/L (ref 10–40)
BASOPHILS: 0.9 %
BILIRUBIN, TOTAL: 0.6 MG/DL (ref 0.2–1.2)
BUN: 15 MG/DL (ref 7–25)
CALCIUM: 9.6 MG/DL (ref 8.6–10.3)
CARBON DIOXIDE: 29 MMOL/L (ref 20–32)
CARDIO CRP(R): 1.4 MG/L
CHLORIDE: 102 MMOL/L (ref 98–110)
CHOL/HDLC RATIO: 6.1 (CALC)
CHOLESTEROL, TOTAL: 249 MG/DL
CREATININE: 0.92 MG/DL (ref 0.6–1.29)
EGFR: 103 ML/MIN/1.73M2
EOSINOPHILS: 4.3 %
GLOBULIN: 2.4 G/DL (CALC) (ref 1.9–3.7)
GLUCOSE: 85 MG/DL (ref 65–99)
HDL CHOLESTEROL: 41 MG/DL
HEMATOCRIT: 45.1 % (ref 38.5–50)
HEMOGLOBIN A1C: 5 % OF TOTAL HGB
HEMOGLOBIN: 14.9 G/DL (ref 13.2–17.1)
LDL-CHOLESTEROL: 168 MG/DL (CALC)
LYMPHOCYTES: 41 %
MCH: 30.1 PG (ref 27–33)
MCHC: 33 G/DL (ref 32–36)
MCV: 91.1 FL (ref 80–100)
MONOCYTES: 7.6 %
MPV: 12.2 FL (ref 7.5–12.5)
NEUTROPHILS: 46.2 %
NON-HDL CHOLESTEROL: 208 MG/DL (CALC)
PLATELET COUNT: 265 THOUSAND/UL (ref 140–400)
POTASSIUM: 4.3 MMOL/L (ref 3.5–5.3)
PROTEIN, TOTAL: 6.8 G/DL (ref 6.1–8.1)
RDW: 12.4 % (ref 11–15)
RED BLOOD CELL COUNT: 4.95 MILLION/UL (ref 4.2–5.8)
SODIUM: 137 MMOL/L (ref 135–146)
TRIGLYCERIDES: 238 MG/DL
TSH W/REFLEX TO FT4: 1.44 MIU/L (ref 0.4–4.5)
VITAMIN D, 25-OH, TOTAL: 30 NG/ML (ref 30–100)
WHITE BLOOD CELL COUNT: 4.4 THOUSAND/UL (ref 3.8–10.8)

## 2023-07-28 NOTE — OPERATIVE REPORT
ESOPHAGOGASTRODUODENOSCOPY (EGD) REPORT    Vinod De Leon    WILVER 10/16/1975 Age 39year old   PCP Pieter Hung MD Endoscopist Chandan Savage MD     Date of procedure: 2017    Procedure: EGD w/guide-wire Savary dilatation, and cold biopsy [de-identified] : I discussed his condition and treatment course.  Follow up in 4 weeks.  Patient voiced understanding and agreement with the plan.\par  stomach distended normally. Normal rugal folds were seen. The pylorus was patent. The gastric mucosa appeared normal. Retroflexion revealed a normal fundus and a non-patulous cardia.      3. Duodenum: The duodenal mucosa appeared normal in the 1st and 2nd p

## 2024-08-21 ENCOUNTER — LAB ENCOUNTER (OUTPATIENT)
Dept: LAB | Age: 49
End: 2024-08-21
Attending: INTERNAL MEDICINE
Payer: COMMERCIAL

## 2024-08-21 ENCOUNTER — LAB ENCOUNTER (OUTPATIENT)
Dept: LAB | Facility: REFERENCE LAB | Age: 49
End: 2024-08-21
Attending: INTERNAL MEDICINE
Payer: COMMERCIAL

## 2024-08-21 ENCOUNTER — OFFICE VISIT (OUTPATIENT)
Dept: INTERNAL MEDICINE CLINIC | Facility: CLINIC | Age: 49
End: 2024-08-21
Payer: COMMERCIAL

## 2024-08-21 VITALS
WEIGHT: 249 LBS | BODY MASS INDEX: 31.95 KG/M2 | HEIGHT: 74 IN | SYSTOLIC BLOOD PRESSURE: 120 MMHG | HEART RATE: 54 BPM | RESPIRATION RATE: 17 BRPM | OXYGEN SATURATION: 99 % | TEMPERATURE: 98 F | DIASTOLIC BLOOD PRESSURE: 71 MMHG

## 2024-08-21 DIAGNOSIS — Z00.00 PREVENTATIVE HEALTH CARE: ICD-10-CM

## 2024-08-21 DIAGNOSIS — Z00.00 ANNUAL PHYSICAL EXAM: ICD-10-CM

## 2024-08-21 DIAGNOSIS — K21.9 GASTROESOPHAGEAL REFLUX DISEASE, UNSPECIFIED WHETHER ESOPHAGITIS PRESENT: ICD-10-CM

## 2024-08-21 DIAGNOSIS — Z00.00 ANNUAL PHYSICAL EXAM: Primary | ICD-10-CM

## 2024-08-21 LAB
ALBUMIN SERPL-MCNC: 4.5 G/DL (ref 3.2–4.8)
ALBUMIN/GLOB SERPL: 1.9 {RATIO} (ref 1–2)
ALP LIVER SERPL-CCNC: 62 U/L
ALT SERPL-CCNC: 15 U/L
ANION GAP SERPL CALC-SCNC: 3 MMOL/L (ref 0–18)
AST SERPL-CCNC: 23 U/L (ref ?–34)
ATRIAL RATE: 46 BPM
BASOPHILS # BLD AUTO: 0.03 X10(3) UL (ref 0–0.2)
BASOPHILS NFR BLD AUTO: 0.6 %
BILIRUB SERPL-MCNC: 0.4 MG/DL (ref 0.3–1.2)
BILIRUB UR QL: NEGATIVE
BUN BLD-MCNC: 13 MG/DL (ref 9–23)
BUN/CREAT SERPL: 13 (ref 10–20)
CALCIUM BLD-MCNC: 9.6 MG/DL (ref 8.7–10.4)
CHLORIDE SERPL-SCNC: 107 MMOL/L (ref 98–112)
CHOLEST SERPL-MCNC: 234 MG/DL (ref ?–200)
CLARITY UR: CLEAR
CO2 SERPL-SCNC: 28 MMOL/L (ref 21–32)
CREAT BLD-MCNC: 1 MG/DL
DEPRECATED RDW RBC AUTO: 41.4 FL (ref 35.1–46.3)
EGFRCR SERPLBLD CKD-EPI 2021: 93 ML/MIN/1.73M2 (ref 60–?)
EOSINOPHIL # BLD AUTO: 0.25 X10(3) UL (ref 0–0.7)
EOSINOPHIL NFR BLD AUTO: 4.8 %
ERYTHROCYTE [DISTWIDTH] IN BLOOD BY AUTOMATED COUNT: 12.7 % (ref 11–15)
FASTING PATIENT LIPID ANSWER: YES
FASTING STATUS PATIENT QL REPORTED: YES
GLOBULIN PLAS-MCNC: 2.4 G/DL (ref 2–3.5)
GLUCOSE BLD-MCNC: 95 MG/DL (ref 70–99)
GLUCOSE UR-MCNC: NORMAL MG/DL
HCT VFR BLD AUTO: 43.8 %
HDLC SERPL-MCNC: 35 MG/DL (ref 40–59)
HGB BLD-MCNC: 15.3 G/DL
HGB UR QL STRIP.AUTO: NEGATIVE
IMM GRANULOCYTES # BLD AUTO: 0 X10(3) UL (ref 0–1)
IMM GRANULOCYTES NFR BLD: 0 %
KETONES UR-MCNC: NEGATIVE MG/DL
LDLC SERPL CALC-MCNC: 112 MG/DL (ref ?–100)
LEUKOCYTE ESTERASE UR QL STRIP.AUTO: NEGATIVE
LYMPHOCYTES # BLD AUTO: 2.03 X10(3) UL (ref 1–4)
LYMPHOCYTES NFR BLD AUTO: 39.1 %
MCH RBC QN AUTO: 31.2 PG (ref 26–34)
MCHC RBC AUTO-ENTMCNC: 34.9 G/DL (ref 31–37)
MCV RBC AUTO: 89.4 FL
MONOCYTES # BLD AUTO: 0.44 X10(3) UL (ref 0.1–1)
MONOCYTES NFR BLD AUTO: 8.5 %
NEUTROPHILS # BLD AUTO: 2.44 X10 (3) UL (ref 1.5–7.7)
NEUTROPHILS # BLD AUTO: 2.44 X10(3) UL (ref 1.5–7.7)
NEUTROPHILS NFR BLD AUTO: 47 %
NITRITE UR QL STRIP.AUTO: NEGATIVE
NONHDLC SERPL-MCNC: 199 MG/DL (ref ?–130)
OSMOLALITY SERPL CALC.SUM OF ELEC: 286 MOSM/KG (ref 275–295)
P AXIS: 59 DEGREES
P-R INTERVAL: 158 MS
PH UR: 6.5 [PH] (ref 5–8)
PLATELET # BLD AUTO: 229 10(3)UL (ref 150–450)
POTASSIUM SERPL-SCNC: 4.2 MMOL/L (ref 3.5–5.1)
PROT SERPL-MCNC: 6.9 G/DL (ref 5.7–8.2)
PROT UR-MCNC: NEGATIVE MG/DL
Q-T INTERVAL: 460 MS
QRS DURATION: 98 MS
QTC CALCULATION (BEZET): 402 MS
R AXIS: 15 DEGREES
RBC # BLD AUTO: 4.9 X10(6)UL
SODIUM SERPL-SCNC: 138 MMOL/L (ref 136–145)
SP GR UR STRIP: 1.01 (ref 1–1.03)
T AXIS: 12 DEGREES
TRIGL SERPL-MCNC: 502 MG/DL (ref 30–149)
TSI SER-ACNC: 2.01 MIU/ML (ref 0.55–4.78)
UROBILINOGEN UR STRIP-ACNC: NORMAL
VENTRICULAR RATE: 46 BPM
VLDLC SERPL CALC-MCNC: 90 MG/DL (ref 0–30)
WBC # BLD AUTO: 5.2 X10(3) UL (ref 4–11)

## 2024-08-21 PROCEDURE — 36415 COLL VENOUS BLD VENIPUNCTURE: CPT

## 2024-08-21 PROCEDURE — 80061 LIPID PANEL: CPT

## 2024-08-21 PROCEDURE — 93010 ELECTROCARDIOGRAM REPORT: CPT | Performed by: INTERNAL MEDICINE

## 2024-08-21 PROCEDURE — 84443 ASSAY THYROID STIM HORMONE: CPT

## 2024-08-21 PROCEDURE — 90715 TDAP VACCINE 7 YRS/> IM: CPT | Performed by: INTERNAL MEDICINE

## 2024-08-21 PROCEDURE — 90471 IMMUNIZATION ADMIN: CPT | Performed by: INTERNAL MEDICINE

## 2024-08-21 PROCEDURE — 85025 COMPLETE CBC W/AUTO DIFF WBC: CPT

## 2024-08-21 PROCEDURE — 80053 COMPREHEN METABOLIC PANEL: CPT

## 2024-08-21 PROCEDURE — 93005 ELECTROCARDIOGRAM TRACING: CPT

## 2024-08-21 PROCEDURE — 99396 PREV VISIT EST AGE 40-64: CPT | Performed by: INTERNAL MEDICINE

## 2024-08-21 PROCEDURE — 81003 URINALYSIS AUTO W/O SCOPE: CPT

## 2024-08-21 NOTE — PROGRESS NOTES
Subjective:     Patient ID: Kwasi Jauregui is a 48 year old male.    HPI  For annual physical overall doing okay denies any complaints except for he takes omeprazole as needed for history of GERD he had an EGD done 2017 he has not seen GI since then but will take PPI once in a while when he feels the symptoms mostly chest pain shortness of breath no family history of heart disease    History/Other:   Review of Systems   Constitutional: Negative.    HENT: Negative.     Eyes: Negative.    Respiratory: Negative.     Cardiovascular: Negative.    Gastrointestinal: Negative.    Genitourinary: Negative.    Musculoskeletal: Negative.    Skin: Negative.    Neurological: Negative.    Psychiatric/Behavioral: Negative.       Current Outpatient Medications   Medication Sig Dispense Refill    Omeprazole 10 MG Oral Capsule Delayed Release Take 1 capsule (10 mg total) by mouth daily.      Vitamin D3 (VITAMIN D3) 2000 UNITS Oral Cap Take  by mouth.       Allergies:No Known Allergies    Past Medical History:    Anxiety    Under control    Eosinophilic esophagitis    Food impaction, based on bx of esophagus    Esophageal reflux    PPI      Past Surgical History:   Procedure Laterality Date    Adenoidectomy      childhood    Electrocardiogram, complete  2013    scanned to media tab    Upper gi endoscopy,exam        Family History   Problem Relation Age of Onset    Cancer Father         Multiple Myeloma    Thyroid disease Sister       Social History:   Social History     Socioeconomic History    Marital status:    Tobacco Use    Smoking status: Former     Current packs/day: 0.00     Types: Cigarettes     Quit date: 2003     Years since quittin.6     Passive exposure: Never    Smokeless tobacco: Never   Vaping Use    Vaping status: Never Used   Substance and Sexual Activity    Alcohol use: Yes     Alcohol/week: 8.0 standard drinks of alcohol     Types: 5 Cans of beer, 3 Standard drinks or equivalent per week      Comment: beer and wine - 1 drink, daily    Drug use: Not Currently     Types: Cannabis    Sexual activity: Yes     Partners: Female   Other Topics Concern    Caffeine Concern No        Objective:   Physical Exam  Vitals and nursing note reviewed.   Constitutional:       Appearance: He is well-developed.   HENT:      Head: Normocephalic and atraumatic.      Right Ear: External ear normal.      Left Ear: External ear normal.      Nose: Nose normal.   Eyes:      Conjunctiva/sclera: Conjunctivae normal.      Pupils: Pupils are equal, round, and reactive to light.   Cardiovascular:      Rate and Rhythm: Normal rate and regular rhythm.      Heart sounds: Normal heart sounds.   Pulmonary:      Effort: Pulmonary effort is normal.      Breath sounds: Normal breath sounds.   Abdominal:      General: Bowel sounds are normal.      Palpations: Abdomen is soft.   Genitourinary:     Penis: Normal.       Prostate: Normal.      Rectum: Normal.   Musculoskeletal:         General: Normal range of motion.      Cervical back: Normal range of motion and neck supple.   Skin:     General: Skin is warm and dry.   Neurological:      Mental Status: He is alert and oriented to person, place, and time.      Deep Tendon Reflexes: Reflexes are normal and symmetric.         Assessment & Plan:   1. Annual physical exam exam is okay will order labs    2. Gastroesophageal reflux disease, unspecified whether esophagitis present will refer to GI for screening colonoscopy and follow-up on her GERD symptoms    3. Preventative health care as above follow-up with GI       Orders Placed This Encounter   Procedures    CBC With Differential With Platelet    Comp Metabolic Panel (14)    Lipid Panel    TSH W Reflex To Free T4    Urinalysis, Routine    TETANUS, DIPHTHERIA TOXOIDS AND ACELLULAR PERTUSIS VACCINE (TDAP), >7 YEARS, IM USE       Meds This Visit:  Requested Prescriptions      No prescriptions requested or ordered in this encounter       Imaging &  Referrals:  GASTRO - INTERNAL  TETANUS, DIPHTHERIA TOXOIDS AND ACELLULAR PERTUSIS VACCINE (TDAP), >7 YEARS, IM USE

## 2025-08-06 ENCOUNTER — OFFICE VISIT (OUTPATIENT)
Dept: INTERNAL MEDICINE CLINIC | Facility: CLINIC | Age: 50
End: 2025-08-06

## 2025-08-06 ENCOUNTER — TELEPHONE (OUTPATIENT)
Facility: CLINIC | Age: 50
End: 2025-08-06

## 2025-08-06 VITALS
OXYGEN SATURATION: 99 % | SYSTOLIC BLOOD PRESSURE: 120 MMHG | WEIGHT: 243 LBS | HEIGHT: 74 IN | DIASTOLIC BLOOD PRESSURE: 71 MMHG | BODY MASS INDEX: 31.18 KG/M2 | TEMPERATURE: 98 F | HEART RATE: 66 BPM | RESPIRATION RATE: 18 BRPM

## 2025-08-06 DIAGNOSIS — K42.9 UMBILICAL HERNIA WITHOUT OBSTRUCTION AND WITHOUT GANGRENE: Primary | ICD-10-CM

## 2025-08-06 DIAGNOSIS — Z00.00 ENCOUNTER FOR PREVENTIVE CARE: ICD-10-CM

## 2025-08-06 PROCEDURE — 99213 OFFICE O/P EST LOW 20 MIN: CPT | Performed by: INTERNAL MEDICINE

## 2025-08-07 ENCOUNTER — NURSE ONLY (OUTPATIENT)
Facility: CLINIC | Age: 50
End: 2025-08-07

## 2025-08-07 DIAGNOSIS — Z12.11 COLON CANCER SCREENING: Primary | ICD-10-CM

## (undated) DEVICE — THE DISPOSABLE RAPTOR GRASPING DEVICE-MINI IS BE USED TO GRASP TISSUE AND/OR RETRIEVE FOREIGN BODIES, EXCISED TISSUE AND STENTS DURING ENDOSCOPIC PROCEDURES.: Brand: RAPTOR

## (undated) DEVICE — FORCEP RADIAL JAW 4

## (undated) DEVICE — Device: Brand: DEFENDO AIR/WATER/SUCTION AND BIOPSY VALVE

## (undated) DEVICE — ENDOSCOPY PACK UPPER: Brand: MEDLINE INDUSTRIES, INC.

## (undated) DEVICE — DEVICE SPEC RTRVL RPTR 2.4MM

## (undated) NOTE — LETTER
5/19/2017              Girish Lam        [address] [facility] [address]         Dear Marion Montes,    I reviewed the pathology report from the biopsies done during your recent upper endoscopy.  Based on the report, the biopsies of your food pi

## (undated) NOTE — IP AVS SNAPSHOT
[facility]    [address], [place], [place] ~ [phone]                Discharge Summary   3/26/2017    Leona Meehan           Admission Information        Department    3/26/2017 [facility] Endoscopy Lab      Surgical Procedu - If you experience any sharp pain in your neck, abdomen or chest, vomiting of blood, oral temperature over 100 degrees Fahrenheit, light-headedness or dizziness, or any other problems, contact your doctor.     **If unable to reach your doctor, please go to ALT Bilirubin,Total Total Protein Albumin Sodium Potassium Chloride    -- -- -- -- (03/26/17)  140 (03/26/17)  4.1 (03/26/17)  106      Radiology Exams     None         Additional Information       We are concerned for your overall well being:    - If you

## (undated) NOTE — IP AVS SNAPSHOT
[facility]    [address], [facility], [place] ~ [phone]                Discharge Summary   5/17/2017    Kyle Babin           Admission Information        Provider Department    5/17/2017 Yosvany Waite MD [facility] Patricia Singer light-headedness or dizziness, or any other problems, contact your doctor.     **If unable to reach your doctor, please go to the [facility] Emergency Room**    - Your referring physician will receive a full report of your examination.  - If y We are concerned for your overall well being:    - If you are a smoker or have smoked in the last 12 months, we encourage you to explore options for quitting.     - If you have concerns related to behavioral health issues or thoughts of harming yoursel